# Patient Record
Sex: FEMALE | Race: BLACK OR AFRICAN AMERICAN | NOT HISPANIC OR LATINO | Employment: FULL TIME | URBAN - METROPOLITAN AREA
[De-identification: names, ages, dates, MRNs, and addresses within clinical notes are randomized per-mention and may not be internally consistent; named-entity substitution may affect disease eponyms.]

---

## 2017-07-07 ENCOUNTER — HOSPITAL ENCOUNTER (OUTPATIENT)
Facility: HOSPITAL | Age: 27
Discharge: HOME | End: 2017-07-07
Attending: PODIATRIST | Admitting: PODIATRIST
Payer: MEDICAID

## 2017-07-07 ENCOUNTER — ANESTHESIA EVENT (OUTPATIENT)
Dept: SURGERY | Facility: HOSPITAL | Age: 27
End: 2017-07-07
Payer: MEDICAID

## 2017-07-07 ENCOUNTER — ANESTHESIA (OUTPATIENT)
Dept: SURGERY | Facility: HOSPITAL | Age: 27
End: 2017-07-07
Payer: MEDICAID

## 2017-07-07 DIAGNOSIS — M20.40 HAMMER TOE: ICD-10-CM

## 2017-07-07 LAB
B-HCG UR QL: NEGATIVE
CTP QC/QA: YES

## 2017-07-07 PROCEDURE — 36000706: Performed by: PODIATRIST

## 2017-07-07 PROCEDURE — 63600175 PHARM REV CODE 636 W HCPCS: Performed by: NURSE ANESTHETIST, CERTIFIED REGISTERED

## 2017-07-07 PROCEDURE — 37000009 HC ANESTHESIA EA ADD 15 MINS: Performed by: PODIATRIST

## 2017-07-07 PROCEDURE — 25000003 PHARM REV CODE 250: Performed by: NURSE ANESTHETIST, CERTIFIED REGISTERED

## 2017-07-07 PROCEDURE — 71000015 HC POSTOP RECOV 1ST HR: Performed by: PODIATRIST

## 2017-07-07 PROCEDURE — 37000008 HC ANESTHESIA 1ST 15 MINUTES: Performed by: PODIATRIST

## 2017-07-07 PROCEDURE — 63600175 PHARM REV CODE 636 W HCPCS: Performed by: PODIATRIST

## 2017-07-07 PROCEDURE — 71000016 HC POSTOP RECOV ADDL HR: Performed by: PODIATRIST

## 2017-07-07 PROCEDURE — 25000003 PHARM REV CODE 250: Performed by: PODIATRIST

## 2017-07-07 PROCEDURE — 36000707: Performed by: PODIATRIST

## 2017-07-07 PROCEDURE — 27201423 OPTIME MED/SURG SUP & DEVICES STERILE SUPPLY: Performed by: PODIATRIST

## 2017-07-07 RX ORDER — MIDAZOLAM HYDROCHLORIDE 1 MG/ML
INJECTION, SOLUTION INTRAMUSCULAR; INTRAVENOUS
Status: DISCONTINUED | OUTPATIENT
Start: 2017-07-07 | End: 2017-07-07

## 2017-07-07 RX ORDER — ROPIVACAINE HYDROCHLORIDE 5 MG/ML
INJECTION, SOLUTION EPIDURAL; INFILTRATION; PERINEURAL
Status: DISCONTINUED | OUTPATIENT
Start: 2017-07-07 | End: 2017-07-07 | Stop reason: HOSPADM

## 2017-07-07 RX ORDER — ONDANSETRON 2 MG/ML
INJECTION INTRAMUSCULAR; INTRAVENOUS
Status: DISCONTINUED | OUTPATIENT
Start: 2017-07-07 | End: 2017-07-07

## 2017-07-07 RX ORDER — SODIUM CHLORIDE, SODIUM LACTATE, POTASSIUM CHLORIDE, CALCIUM CHLORIDE 600; 310; 30; 20 MG/100ML; MG/100ML; MG/100ML; MG/100ML
INJECTION, SOLUTION INTRAVENOUS CONTINUOUS PRN
Status: DISCONTINUED | OUTPATIENT
Start: 2017-07-07 | End: 2017-07-07

## 2017-07-07 RX ORDER — PROPOFOL 10 MG/ML
VIAL (ML) INTRAVENOUS
Status: DISCONTINUED | OUTPATIENT
Start: 2017-07-07 | End: 2017-07-07

## 2017-07-07 RX ORDER — CEFAZOLIN SODIUM 2 G/50ML
2 SOLUTION INTRAVENOUS
Status: DISCONTINUED | OUTPATIENT
Start: 2017-07-07 | End: 2017-07-07 | Stop reason: HOSPADM

## 2017-07-07 RX ORDER — PROPOFOL 10 MG/ML
VIAL (ML) INTRAVENOUS CONTINUOUS PRN
Status: DISCONTINUED | OUTPATIENT
Start: 2017-07-07 | End: 2017-07-07

## 2017-07-07 RX ORDER — FENTANYL CITRATE 50 UG/ML
INJECTION, SOLUTION INTRAMUSCULAR; INTRAVENOUS
Status: DISCONTINUED | OUTPATIENT
Start: 2017-07-07 | End: 2017-07-07

## 2017-07-07 RX ORDER — LIDOCAINE HYDROCHLORIDE 10 MG/ML
INJECTION, SOLUTION EPIDURAL; INFILTRATION; INTRACAUDAL; PERINEURAL
Status: DISCONTINUED | OUTPATIENT
Start: 2017-07-07 | End: 2017-07-07 | Stop reason: HOSPADM

## 2017-07-07 RX ORDER — LIDOCAINE HCL/PF 100 MG/5ML
SYRINGE (ML) INTRAVENOUS
Status: DISCONTINUED | OUTPATIENT
Start: 2017-07-07 | End: 2017-07-07

## 2017-07-07 RX ORDER — HYDROCODONE BITARTRATE AND ACETAMINOPHEN 5; 325 MG/1; MG/1
1 TABLET ORAL EVERY 4 HOURS PRN
Status: DISCONTINUED | OUTPATIENT
Start: 2017-07-07 | End: 2017-07-07 | Stop reason: HOSPADM

## 2017-07-07 RX ADMIN — FENTANYL CITRATE 25 MCG: 50 INJECTION, SOLUTION INTRAMUSCULAR; INTRAVENOUS at 10:07

## 2017-07-07 RX ADMIN — LIDOCAINE HYDROCHLORIDE 80 MG: 20 INJECTION, SOLUTION INTRAVENOUS at 10:07

## 2017-07-07 RX ADMIN — CEFAZOLIN SODIUM 2 G: 2 SOLUTION INTRAVENOUS at 10:07

## 2017-07-07 RX ADMIN — ONDANSETRON 4 MG: 2 INJECTION, SOLUTION INTRAMUSCULAR; INTRAVENOUS at 10:07

## 2017-07-07 RX ADMIN — PROPOFOL 50 MG: 10 INJECTION, EMULSION INTRAVENOUS at 10:07

## 2017-07-07 RX ADMIN — MIDAZOLAM 3 MG: 1 INJECTION INTRAMUSCULAR; INTRAVENOUS at 09:07

## 2017-07-07 RX ADMIN — PROPOFOL 125 MCG/KG/MIN: 10 INJECTION, EMULSION INTRAVENOUS at 10:07

## 2017-07-07 RX ADMIN — PROPOFOL 30 MG: 10 INJECTION, EMULSION INTRAVENOUS at 10:07

## 2017-07-07 RX ADMIN — SODIUM CHLORIDE, SODIUM LACTATE, POTASSIUM CHLORIDE, AND CALCIUM CHLORIDE: .6; .31; .03; .02 INJECTION, SOLUTION INTRAVENOUS at 08:07

## 2017-07-07 NOTE — BRIEF OP NOTE
[facility]  Brief Operative Note     SUMMARY     Surgery Date: 7/7/2017     Surgeon(s) and Role:     * Juan Leigh DPM - Primary    Assisting Surgeon: Kristine Villanueva DPM, PGY2    Pre-op Diagnosis:  Acquired bilateral hammer toes [M20.41, M20.42]    Post-op Diagnosis:  Post-Op Diagnosis Codes:     * Acquired bilateral hammer toes [M20.41, M20.42]    Procedure(s) (LRB):  REPAIR-HAMMER TOE (Bilateral)    Anesthesia: Local MAC    Description of the findings of the procedure: Hammer toe with varus rotation and exostosis of donavon 5th digits    Findings/Key Components: arthroplasty of 5th digit with exostectomy donavon.     Estimated Blood Loss: < 5 cc        Specimens:   Specimen (12h ago through future)    None          Discharge Note    SUMMARY     Admit Date: 7/7/2017    Discharge Date and Time:  07/07/2017 11:15 AM    Hospital Course (synopsis of major diagnoses, care, treatment, and services provided during the course of the hospital stay):    Final Diagnosis: Post-Op Diagnosis Codes:     * Acquired bilateral hammer toes [M20.41, M20.42]    Disposition: Home or Self Care    Follow Up/Patient Instructions:     Medications:  Reconciled Home Medications:   Current Discharge Medication List      CONTINUE these medications which have NOT CHANGED    Details   bifidobacterium infantis (ALIGN) 4 mg Cap Take 1 capsule by mouth once daily.  Qty: 30 capsule, Refills: 0      boric acid (BORIC ACID) vaginal suppository insert ONE SUPPOSITORY VAGINALLY EVERY NIGHT AT BEDTIME  Refills: 1             Discharge Procedure Orders  Diet general     Ice to affected area     Keep surgical extremity elevated     Weight bearing restrictions (specify)     Call MD for:  temperature >100.4     Call MD for:  persistent nausea and vomiting     Call MD for:  severe uncontrolled pain     Call MD for:  difficulty breathing, headache or visual disturbances     Call MD for:  redness, tenderness, or signs of infection (pain,  swelling, redness, odor or green/yellow discharge around incision site)     Call MD for:  hives     Call MD for:  persistent dizziness or light-headedness     Call MD for:  extreme fatigue     Leave dressing on - Keep it clean, dry, and intact until clinic visit       Follow-up Information     Juan Leigh DPM.    Specialty:  Podiatry  Contact information:  [id number]  [place] [id number] [phone]

## 2017-07-07 NOTE — PLAN OF CARE
Problem: Patient Care Overview  Goal: Plan of Care Review  vss nad discharge instructions given with claimed understanding. Denies pain

## 2017-07-07 NOTE — OP NOTE
Operative Note       Surgery Date: 7/7/2017     Surgeon(s) and Role:     * Juan Leigh DPM - Primary    Pre-op Diagnosis:  Acquired bilateral hammer toes [M20.41, M20.42]    Post-op Diagnosis: Post-Op Diagnosis Codes:     * Acquired bilateral hammer toes [M20.41, M20.42]    Procedure(s) (LRB):  REPAIR-HAMMER TOE (Bilateral)    Anesthesia: Local MAC    Procedure in Detail/Findings:  The patient was brought to the operating room on a stretcher and placed on the operating table in a supine position. Following the successful induction of MAC anesthesia, a tourniquet was applied to the patients donavon ankle. Following this, a local anesthetic block was given as described above. Then, the donavon foot was scrubbed, prepped and draped in the usual aseptic manner. A marking pen was utilized to create incision guides along the 5th MTPJ/PIPJ, donavon. A time out was performed and an esmark was used to exsanguinate the foot. The tourniquet was inflated to 250mmHg.   Attention was then directed towards the 5th digit of the left foot, where an elliptical piece of skin was removed from the dorsal lateral aspect of the digit to allow for abduction with closure to reduce the adductovarus noted within the toe. The incision was deepened through the subcutaneous tissue and into the PIPJ using a transverse capsulotomy. The head of the proximal phalanx was resected using the sagittal saw for a digital arthroplasty. Attention was then drawn to the lateral distal exostosis, where a sterile # 15 blade was used to incise the skin down to bone and the sagittal saw was used to delicately feather the bone until it was smooth. This same exact procedure, including the correction of adductovarus deformity, arthroplasty, and exostectomy, was then performed on the right foot.   All areas were copioulsy irrigated with saline and all subcutaneous and deep structures were re-approximated with 4-0 vicryl. The skin incision was re-approximated with 4-0 Nylon  in simple sutures. The toe of each foot was gently splinted in a rectus position with longitudinal 4x4 gauze. The incision was covered with xeroform, 4x4 fluff gauze, kerlix and ACE wrap. Both feet were secured in a post op shoe.   The patient tolerated the procedure and anesthesia well. She was transferred to the recovery room with vital signs stable, vascular status intact, and capillary refill time < 3 seconds to the distal donavon foot. Following a period of post op monitoring, the patient will be discharged home on the following written and oral post op instructions:   1. Keep dressing dry and intact until clinic visit.  2. Avoid excessive ambulation, ambulate with surgical shoe on at all times.  3. Ice and elevate right foot when at rest.  4. All prescriptions were given to patient post operatively  5. Contact Dr. Leigh for all post op follow up care and if any problems arise.      Estimated Blood Loss: < 5 cc          Specimens     None        Implants: * No implants in log *           Disposition: PACU - hemodynamically stable.           Condition: Good    Attestation:  I was present and scrubbed for the entire procedure.           Discharge Note    Admit Date: 7/7/2017    Attending Physician: Juan Leigh DPM     Discharge Physician: Juan Leigh DPM    Final Diagnosis: Post-Op Diagnosis Codes:     * Acquired bilateral hammer toes [M20.41, M20.42]    Disposition: Home or Self Care    Patient Instructions:   Current Discharge Medication List      CONTINUE these medications which have NOT CHANGED    Details   bifidobacterium infantis (ALIGN) 4 mg Cap Take 1 capsule by mouth once daily.  Qty: 30 capsule, Refills: 0      boric acid (BORIC ACID) vaginal suppository insert ONE SUPPOSITORY VAGINALLY EVERY NIGHT AT BEDTIME  Refills: 1             Discharge Procedure Orders (must include Diet, Follow-up, Activity)    Discharge Procedure Orders (must include Diet, Follow-up, Activity)  Diet general     Ice  to affected area     Keep surgical extremity elevated     Weight bearing restrictions (specify)     Call MD for:  temperature >100.4     Call MD for:  persistent nausea and vomiting     Call MD for:  severe uncontrolled pain     Call MD for:  difficulty breathing, headache or visual disturbances     Call MD for:  redness, tenderness, or signs of infection (pain, swelling, redness, odor or green/yellow discharge around incision site)     Call MD for:  hives     Call MD for:  persistent dizziness or light-headedness     Call MD for:  extreme fatigue     Leave dressing on - Keep it clean, dry, and intact until clinic visit          Discharge Date: No discharge date for patient encounter.

## 2017-07-07 NOTE — PLAN OF CARE
POC board updated and reviewed with pt and pt's family. Pt and pt's family verbalize understanding. Safety precautions maintained. Call bell in reach. Bed locked and in lowest position. Instructed pt to call for assistance. Pt verbalizes understanding.

## 2017-07-07 NOTE — ANESTHESIA POSTPROCEDURE EVALUATION
"Anesthesia Post Evaluation    Patient: Iris Jarvis    Procedure(s) Performed: Procedure(s) (LRB):  REPAIR-HAMMER TOE (Bilateral)    Final Anesthesia Type: MAC  Patient location during evaluation: OPS  Patient participation: Yes- Able to Participate  Level of consciousness: awake and alert and oriented  Post-procedure vital signs: reviewed and stable  Pain management: adequate  Airway patency: patent  PONV status at discharge: No PONV  Anesthetic complications: no      Cardiovascular status: blood pressure returned to baseline, hemodynamically stable and stable  Respiratory status: unassisted, spontaneous ventilation and room air  Hydration status: euvolemic  Follow-up not needed.        Visit Vitals  /62 (BP Location: Right arm, Patient Position: Lying, BP Method: Automatic)   Pulse 78   Temp 37 °C (98.6 °F) (Oral)   Resp 16   Ht 5' 3" (1.6 m)   Wt 70.8 kg (156 lb)   LMP 06/19/2017 (Approximate)   Breastfeeding? No   BMI 27.63 kg/m²       Pain/Erlin Score: Pain Assessment Performed: Yes (7/7/2017  8:20 AM)  Presence of Pain: denies (7/7/2017  8:20 AM)      "

## 2017-07-07 NOTE — ANESTHESIA PREPROCEDURE EVALUATION
07/07/2017  Iris Jarvis is a 26 y.o., female w [facility] for podiatric repair.    PRIOR ANES (in Epic)    none     ANES-RELATED MED/SURG  There is no problem list on file for this patient.    Past Medical History:   Diagnosis Date    Keloid      Past Surgical History:   Procedure Laterality Date    none       ALLERGIES  Review of patient's allergies indicates:  No Known Allergies    ANES-RELATED HOME Rx   none    Anesthesia Evaluation         Review of Systems  Anesthesia Hx:  Neg history of prior surgery. Denies Family Hx of Anesthesia complications.   Social:  Non-Smoker    EENT/Dental:EENT/Dental Normal   Cardiovascular:  Cardiovascular Normal     Pulmonary:  Pulmonary Normal    Musculoskeletal:  Musculoskeletal Normal    OB/GYN/PEDS:   2017-07-07 denies pregnancy; UPT neg today   Endocrine:  Endocrine Normal      Wt Readings from Last 1 Encounters:   07/14/16 64 kg (141 lb 1.5 oz)     Temp Readings from Last 1 Encounters:   03/28/16 36.6 °C (97.9 °F) (Oral)     BP Readings from Last 1 Encounters:   05/05/16 100/70     Pulse Readings from Last 1 Encounters:   04/15/16 83     SpO2 Readings from Last 1 Encounters:   04/15/16 98%       Physical Exam  General:  Well nourished    Airway/Jaw/Neck:  AIRWAY FINDINGS: Normal         Heart/Vascular:  Heart Findings: Normal Heart murmur: negative       Mental Status:  Mental Status Findings: Normal       Lab Results   Component Value Date    WBC 4.90 04/15/2016    HGB 12.8 04/15/2016    HCT 39.5 04/15/2016    MCV 89 04/15/2016     04/15/2016       Chemistry        Component Value Date/Time     04/15/2016 1058    K 4.1 04/15/2016 1058     04/15/2016 1058    CO2 20 (L) 04/15/2016 1058    BUN 10 04/15/2016 1058    CREATININE 0.7 04/15/2016 1058    GLU 85 04/15/2016 1058        Component Value Date/Time    CALCIUM 9.3 04/15/2016 1058     ALKPHOS 46 (L) 04/15/2016 1058    AST 13 04/15/2016 1058    ALT 11 04/15/2016 1058    BILITOT 0.6 04/15/2016 1058    ESTGFRAFRICA >60.0 04/15/2016 1058    EGFRNONAA >60.0 04/15/2016 1058          Lab Results   Component Value Date    ALBUMIN 3.7 04/15/2016     No results found for: TSH, N3YTGQQ, V3BJVAZ, THYROIDAB    CXR      EKG      ECHO      Anesthesia Plan  Type of Anesthesia, risks & benefits discussed:  Anesthesia Type:  MAC, regional, general  Patient's Preference:   Intra-op Monitoring Plan:   Intra-op Monitoring Plan Comments:   Post Op Pain Control Plan:   Post Op Pain Control Plan Comments:   Induction:    Beta Blocker:  Patient is not currently on a Beta-Blocker (No further documentation required).       Informed Consent: Patient understands risks and agrees with Anesthesia plan.  Questions answered. Anesthesia consent signed with patient.  ASA Score: 1     Day of Surgery Review of History & Physical:            Ready For Surgery From Anesthesia Perspective.

## 2017-07-07 NOTE — DISCHARGE INSTRUCTIONS
DIET: You may resume your home diet. If nausea is present, increase your diet gradually with fluids and bland foods    ACTIVITY LEVEL: If you have received sedation or an anesthetic, you may feel sleepy for several hours. Rest until you are more awake. Gradually resume your normal activities    Medications: Pain medication should be taken only if needed and as directed. If antibiotics are prescribed, the medication should be taken until completed. You will be given an updated list of you medications.    No driving, alcoholic beverages or signing legal documents for next 24 hours or while taking pain medication.       CALL THE DOCTOR:    For any obvious bleeding (some dried blood over the incision is normal).      Redness, swelling, foul smell around incision or fever over 101.   Shortness of breath, Coughing up Bloody sputum, Pains or Swelling in your Calves .   Persistent pain or nausea not relieved by medication.    If any unusual problems or difficulties occur contact your doctor. If you cannot contact your doctor but feel your signs and symptoms warrant a physicians attention return to the emergency room.

## 2017-07-10 VITALS
SYSTOLIC BLOOD PRESSURE: 115 MMHG | BODY MASS INDEX: 27.64 KG/M2 | OXYGEN SATURATION: 97 % | WEIGHT: 156 LBS | TEMPERATURE: 98 F | RESPIRATION RATE: 15 BRPM | HEIGHT: 63 IN | DIASTOLIC BLOOD PRESSURE: 66 MMHG | HEART RATE: 81 BPM

## 2017-11-08 ENCOUNTER — HOSPITAL ENCOUNTER (EMERGENCY)
Facility: HOSPITAL | Age: 27
Discharge: HOME | End: 2017-11-08
Attending: EMERGENCY MEDICINE
Payer: MEDICAID

## 2017-11-08 VITALS
WEIGHT: 162 LBS | HEIGHT: 63 IN | SYSTOLIC BLOOD PRESSURE: 128 MMHG | OXYGEN SATURATION: 100 % | DIASTOLIC BLOOD PRESSURE: 80 MMHG | HEART RATE: 70 BPM | RESPIRATION RATE: 20 BRPM | BODY MASS INDEX: 28.7 KG/M2 | TEMPERATURE: 98 F

## 2017-11-08 DIAGNOSIS — M25.512 ACUTE PAIN OF LEFT SHOULDER: Primary | ICD-10-CM

## 2017-11-08 PROCEDURE — 99283 EMERGENCY DEPT VISIT LOW MDM: CPT

## 2017-11-08 PROCEDURE — 25000003 PHARM REV CODE 250: Performed by: EMERGENCY MEDICINE

## 2017-11-08 RX ORDER — ETHYNODIOL DIACETATE AND ETHINYL ESTRADIOL 1 MG-35MCG
1 KIT ORAL DAILY
COMMUNITY
End: 2018-07-17

## 2017-11-08 RX ORDER — IBUPROFEN 400 MG/1
800 TABLET ORAL
Status: COMPLETED | OUTPATIENT
Start: 2017-11-08 | End: 2017-11-08

## 2017-11-08 RX ORDER — IBUPROFEN 800 MG/1
800 TABLET ORAL EVERY 6 HOURS PRN
Qty: 20 TABLET | Refills: 0 | Status: SHIPPED | OUTPATIENT
Start: 2017-11-08 | End: 2018-07-17

## 2017-11-08 RX ADMIN — IBUPROFEN 800 MG: 400 TABLET, FILM COATED ORAL at 08:11

## 2017-11-08 NOTE — ED PROVIDER NOTES
Encounter Date: 11/8/2017       History     Chief Complaint   Patient presents with    Arm Pain     bilateral arm pain x 2 month intermittently, saw PCP was advised to take ibuprofen and eat bananas but no improvement     Patient is a 27-year-old female who presents to emergency department for evaluation of left shoulder pain that hurts more with movement of her left shoulder.  She has had pain intermittently in her shoulders in the past 2 months.  Patient only the pain radiates from the left shoulder to the left forearm.  She denies any focal weakness or numbness headache fevers neck pain chest pain shortness of breath back pain rash other joint pain or history of lupus.  She has no other complaints at this time.  Her primary care doctor told her to take anti-inflammatories and eat some bananas.  She's never seen an orthopedist.  She denies any trauma or repetitive movement of her left upper extremity's.           Review of patient's allergies indicates:  No Known Allergies  Past Medical History:   Diagnosis Date    Keloid      Past Surgical History:   Procedure Laterality Date    none       Family History   Problem Relation Age of Onset    Hypertension Mother     Hypertension Maternal Grandmother     Diabetes Father     Diabetes Paternal Grandmother     Diabetes Paternal Aunt     Ovarian cancer Neg Hx     Colon cancer Neg Hx     Breast cancer Neg Hx      Social History   Substance Use Topics    Smoking status: Never Smoker    Smokeless tobacco: Never Used    Alcohol use 0.6 oz/week     1 Standard drinks or equivalent per week      Comment: ocasionally      Review of Systems   Respiratory: Negative for shortness of breath.    Cardiovascular: Negative for chest pain.   Musculoskeletal: Positive for arthralgias (bilateral shoulders left greater than right). Negative for back pain and neck pain.   Skin: Negative for rash and wound.   Neurological: Negative for weakness, numbness and headaches.   All  other systems reviewed and are negative.      Physical Exam     Initial Vitals [11/08/17 0729]   BP Pulse Resp Temp SpO2   130/72 82 18 98 °F (36.7 °C) 99 %      MAP       91.33         Physical Exam    Constitutional: She appears well-developed and well-nourished. No distress.   Cardiovascular: Normal rate, regular rhythm, normal heart sounds and intact distal pulses.   2+ radial pulse in the left upper extremity   Pulmonary/Chest: Breath sounds normal. She has no wheezes. She has no rhonchi. She has no rales.   Abdominal: Soft. There is no tenderness.   Musculoskeletal:   Tenderness over the left lateral anterior shoulder.  Not tender over the supraspinatus bursa.  Only able to abduct her shoulder to 90°.  Cannot raise her hand over her head.   Neurological: She is alert and oriented to person, place, and time. She has normal strength. No sensory deficit.   Skin: Skin is warm and dry.         ED Course   Procedures  Labs Reviewed - No data to display          Medical Decision Making:   Patient appears to have inflammation of the left shoulder.  Differential includes autoimmune arthritis, muscle sprain, radiculopathy although she doesn't really have neck pain.  She has no fevers.  I do not believe this is a septic joint given no dysuria or fevers.  There is no erythema or warmth of the shoulder.  She could've a bursitis or tendinitis.  She really needs to follow-up with orthopedics for further workup.  I feel that she is stable for discharge at this time.  I will start her on anti-inflammatories and a shoulder sling.  I don't think she needs emergent workup.                   ED Course      Clinical Impression:   The encounter diagnosis was Acute pain of left shoulder.                           Steven Simms MD  11/08/17 1143

## 2017-11-08 NOTE — ED NOTES
27 year old female presents to ed cc of  Intermittent bilateral arm pain x 2 months. Patient states she feels like weights are on her arms. Patient denies fall or injury to arms. States she was seen at pcp and told to eat bananas  And take ibuprofen for symptoms

## 2017-11-08 NOTE — ED NOTES
APPEARANCE: Alert, oriented and in no acute distress.  CARDIAC: Normal rate and rhythm, no murmur heard.     RESPIRATORY:Normal rate and effort, breath sounds clear bilaterally throughout chest. Respirations are equal and unlabored no obvious signs of distress.  GASTRO: soft, bowel sounds normal, no tenderness, no abdominal distention.  SKIN: Skin is warm and dry, normal skin turgor, mucous membranes moist.  NEURO: 5/5 strength major flexors/extensors bilaterally. Sensory intact to light touch bilaterally. Darwin coma scale: eyes open spontaneously-4, oriented & converses-5, obeys commands-6. No neurological abnormalities.   MENTAL STATUS: awake, alert and aware of environment.  EYE: PERRL, both eyes: pupils brisk and reactive to light. Normal size.  ENT: EARS: no obvious drainage. NOSE: no active bleeding.

## 2018-06-19 ENCOUNTER — HOSPITAL ENCOUNTER (OUTPATIENT)
Dept: RADIOLOGY | Facility: HOSPITAL | Age: 28
Discharge: HOME | End: 2018-06-19
Attending: INTERNAL MEDICINE
Payer: MEDICAID

## 2018-06-19 DIAGNOSIS — M05.741 RHEUMATOID ARTHRITIS INVOLVING BOTH HANDS WITH POSITIVE RHEUMATOID FACTOR: Primary | ICD-10-CM

## 2018-06-19 DIAGNOSIS — M05.741 RHEUMATOID ARTHRITIS INVOLVING BOTH HANDS WITH POSITIVE RHEUMATOID FACTOR: ICD-10-CM

## 2018-06-19 DIAGNOSIS — M05.742 RHEUMATOID ARTHRITIS INVOLVING BOTH HANDS WITH POSITIVE RHEUMATOID FACTOR: ICD-10-CM

## 2018-06-19 DIAGNOSIS — M05.742 RHEUMATOID ARTHRITIS INVOLVING BOTH HANDS WITH POSITIVE RHEUMATOID FACTOR: Primary | ICD-10-CM

## 2018-06-19 PROCEDURE — 73620 X-RAY EXAM OF FOOT: CPT | Mod: 26,50,, | Performed by: RADIOLOGY

## 2018-06-19 PROCEDURE — 73620 X-RAY EXAM OF FOOT: CPT | Mod: 50,TC,FY

## 2018-06-19 PROCEDURE — 73120 X-RAY EXAM OF HAND: CPT | Mod: 26,50,, | Performed by: RADIOLOGY

## 2018-06-19 PROCEDURE — 73120 X-RAY EXAM OF HAND: CPT | Mod: 50,TC,FY

## 2018-07-17 ENCOUNTER — HOSPITAL ENCOUNTER (EMERGENCY)
Facility: HOSPITAL | Age: 28
Discharge: HOME | End: 2018-07-17
Attending: EMERGENCY MEDICINE | Admitting: EMERGENCY MEDICINE
Payer: MEDICAID

## 2018-07-17 VITALS
WEIGHT: 150 LBS | OXYGEN SATURATION: 98 % | TEMPERATURE: 99 F | SYSTOLIC BLOOD PRESSURE: 120 MMHG | RESPIRATION RATE: 16 BRPM | HEART RATE: 88 BPM | BODY MASS INDEX: 26.58 KG/M2 | DIASTOLIC BLOOD PRESSURE: 71 MMHG | HEIGHT: 63 IN

## 2018-07-17 DIAGNOSIS — R05.9 COUGH: ICD-10-CM

## 2018-07-17 DIAGNOSIS — I88.9 LYMPHADENITIS: Primary | ICD-10-CM

## 2018-07-17 LAB
ALBUMIN SERPL BCP-MCNC: 3.5 G/DL
ALP SERPL-CCNC: 37 U/L
ALT SERPL W/O P-5'-P-CCNC: 15 U/L
AMYLASE SERPL-CCNC: 857 U/L
ANION GAP SERPL CALC-SCNC: 8 MMOL/L
AST SERPL-CCNC: 21 U/L
B-HCG UR QL: NEGATIVE
BASOPHILS # BLD AUTO: 0.01 K/UL
BASOPHILS NFR BLD: 0.3 %
BILIRUB SERPL-MCNC: 0.2 MG/DL
BUN SERPL-MCNC: 10 MG/DL
CALCIUM SERPL-MCNC: 9.2 MG/DL
CHLORIDE SERPL-SCNC: 104 MMOL/L
CO2 SERPL-SCNC: 25 MMOL/L
CREAT SERPL-MCNC: 0.7 MG/DL
CTP QC/QA: YES
DEPRECATED S PYO AG THROAT QL EIA: NEGATIVE
DIFFERENTIAL METHOD: ABNORMAL
EOSINOPHIL # BLD AUTO: 0 K/UL
EOSINOPHIL NFR BLD: 0 %
ERYTHROCYTE [DISTWIDTH] IN BLOOD BY AUTOMATED COUNT: 13 %
EST. GFR  (AFRICAN AMERICAN): >60 ML/MIN/1.73 M^2
EST. GFR  (NON AFRICAN AMERICAN): >60 ML/MIN/1.73 M^2
GLUCOSE SERPL-MCNC: 81 MG/DL
HCT VFR BLD AUTO: 40.6 %
HETEROPH AB SERPL QL IA: NEGATIVE
HGB BLD-MCNC: 12.5 G/DL
LYMPHOCYTES # BLD AUTO: 1.1 K/UL
LYMPHOCYTES NFR BLD: 32.7 %
MCH RBC QN AUTO: 26.6 PG
MCHC RBC AUTO-ENTMCNC: 30.8 G/DL
MCV RBC AUTO: 86 FL
MONOCYTES # BLD AUTO: 0.7 K/UL
MONOCYTES NFR BLD: 20.4 %
NEUTROPHILS # BLD AUTO: 1.6 K/UL
NEUTROPHILS NFR BLD: 46.6 %
PLATELET # BLD AUTO: 191 K/UL
PMV BLD AUTO: 10.7 FL
POTASSIUM SERPL-SCNC: 4.3 MMOL/L
PROT SERPL-MCNC: 7.7 G/DL
RBC # BLD AUTO: 4.7 M/UL
SODIUM SERPL-SCNC: 137 MMOL/L
WBC # BLD AUTO: 3.43 K/UL

## 2018-07-17 PROCEDURE — 87081 CULTURE SCREEN ONLY: CPT

## 2018-07-17 PROCEDURE — 85025 COMPLETE CBC W/AUTO DIFF WBC: CPT

## 2018-07-17 PROCEDURE — 99284 EMERGENCY DEPT VISIT MOD MDM: CPT | Mod: 25

## 2018-07-17 PROCEDURE — 87880 STREP A ASSAY W/OPTIC: CPT

## 2018-07-17 PROCEDURE — 81025 URINE PREGNANCY TEST: CPT | Performed by: EMERGENCY MEDICINE

## 2018-07-17 PROCEDURE — 25500020 PHARM REV CODE 255: Performed by: EMERGENCY MEDICINE

## 2018-07-17 PROCEDURE — 80053 COMPREHEN METABOLIC PANEL: CPT

## 2018-07-17 PROCEDURE — 82150 ASSAY OF AMYLASE: CPT

## 2018-07-17 PROCEDURE — 86308 HETEROPHILE ANTIBODY SCREEN: CPT

## 2018-07-17 RX ORDER — FOLIC ACID 1 MG/1
1 TABLET ORAL DAILY
COMMUNITY

## 2018-07-17 RX ORDER — CLINDAMYCIN HYDROCHLORIDE 300 MG/1
300 CAPSULE ORAL 4 TIMES DAILY
Qty: 28 CAPSULE | Refills: 0 | Status: SHIPPED | OUTPATIENT
Start: 2018-07-17 | End: 2018-07-24

## 2018-07-17 RX ORDER — PREDNISONE 5 MG/1
5 TABLET ORAL DAILY
COMMUNITY
End: 2018-11-16

## 2018-07-17 RX ORDER — METHOTREXATE 2.5 MG/1
TABLET ORAL
COMMUNITY
End: 2021-08-19 | Stop reason: ALTCHOICE

## 2018-07-17 RX ORDER — ETHYNODIOL DIACETATE AND ETHINYL ESTRADIOL 1 MG-35MCG
1 KIT ORAL DAILY
COMMUNITY

## 2018-07-17 RX ADMIN — IOHEXOL 75 ML: 350 INJECTION, SOLUTION INTRAVENOUS at 01:07

## 2018-07-17 NOTE — ED PROVIDER NOTES
Encounter Date: 7/17/2018    SCRIBE #1 NOTE: I, Ondina Gonzales, am scribing for, and in the presence of,  Dr. Ramos. I have scribed the entire note.       History     Chief Complaint   Patient presents with    Lymphadenopathy     glands under neck swollen since the weekend, denies fever. sees Gilda Miguel MD for RA but that doctor sent her to ER     Time seen by the provider: 11:28 AM    This is a 27 y.o. female who has a past medical history of Keloid and RA who presents to the Emergency Department from Dr. Miguel's office with progressively worsening swollen lymph nodes x 4 days. Symptoms are associated with trouble swallowing. Pt denies fever. She reports no alleviating or exacerbating factors. Patient has no prior history of similar symptoms. She had all her vaccinations as a child. Denies fevers or dyspnea. Pt is not diabetic        The history is provided by the patient.     Review of patient's allergies indicates:  No Known Allergies  Past Medical History:   Diagnosis Date    Keloid     RA (rheumatoid arthritis)      Past Surgical History:   Procedure Laterality Date    none       Family History   Problem Relation Age of Onset    Hypertension Mother     Hypertension Maternal Grandmother     Diabetes Father     Diabetes Paternal Grandmother     Diabetes Paternal Aunt     Ovarian cancer Neg Hx     Colon cancer Neg Hx     Breast cancer Neg Hx      Social History   Substance Use Topics    Smoking status: Never Smoker    Smokeless tobacco: Never Used    Alcohol use 0.6 oz/week     1 Standard drinks or equivalent per week      Comment: ocasionally      Review of Systems   Constitutional: Negative for activity change, appetite change, chills, diaphoresis, fatigue and fever.   HENT: Positive for trouble swallowing. Negative for sore throat.         Positive for swollen lymph nodes.   Respiratory: Negative for cough, chest tightness and shortness of breath.    Cardiovascular: Negative for chest pain  and palpitations.   Gastrointestinal: Negative for abdominal distention, abdominal pain, diarrhea, nausea and vomiting.   Endocrine: Negative for polydipsia and polyphagia.   Genitourinary: Negative for difficulty urinating, dysuria and flank pain.   Musculoskeletal: Negative for arthralgias.   Skin: Negative for pallor and rash.   Neurological: Negative for dizziness and headaches.   Psychiatric/Behavioral: Negative for confusion.   All other systems reviewed and are negative.      Physical Exam     Initial Vitals [07/17/18 1105]   BP Pulse Resp Temp SpO2   116/68 98 18 98.6 °F (37 °C) 98 %      MAP       --         Physical Exam    Nursing note and vitals reviewed.  Constitutional: She appears well-developed and well-nourished. She is not diaphoretic. No distress.   HENT:   Head: Atraumatic.   Diffuse swelling around both sides of face extending below angle of mandible.  Mild posterior orophangeal erythema and inflammation.   Eyes: Conjunctivae and EOM are normal. Pupils are equal, round, and reactive to light. No scleral icterus.   Neck: Normal range of motion. Neck supple.   Cardiovascular: Normal rate, regular rhythm and normal heart sounds. Exam reveals no gallop and no friction rub.    No murmur heard.  Pulmonary/Chest: Breath sounds normal. She has no wheezes. She has no rhonchi. She has no rales.   Abdominal: Soft. Bowel sounds are normal. There is no tenderness.   Musculoskeletal: Normal range of motion. She exhibits no edema or tenderness.   Lymphadenopathy:   Small left cervical adenopathy.   Neurological: She is alert and oriented to person, place, and time.   Skin: Skin is warm and dry. No rash noted. No erythema.   Psychiatric: She has a normal mood and affect. Her behavior is normal.         ED Course   Procedures  Labs Reviewed   CBC W/ AUTO DIFFERENTIAL - Abnormal; Notable for the following:        Result Value    WBC 3.43 (*)     MCH 26.6 (*)     MCHC 30.8 (*)     Gran # (ANC) 1.6 (*)     Mono%  20.4 (*)     All other components within normal limits   COMPREHENSIVE METABOLIC PANEL - Abnormal; Notable for the following:     Alkaline Phosphatase 37 (*)     All other components within normal limits   AMYLASE - Abnormal; Notable for the following:     Amylase 857 (*)     All other components within normal limits   THROAT SCREEN, RAPID   CULTURE, STREP A,  THROAT   HETEROPHILE AB SCREEN   POCT URINE PREGNANCY          Imaging Results          CT Soft Tissue Neck With Contrast (Final result)  Result time 07/17/18 13:45:36    Final result by Marina Jeffery MD (07/17/18 13:45:36)                 Impression:      Several normal and upper normal size nodes within the bilateral neck region.  They could be reactive to an underlying inflammatory or infectious process.  A neoplastic process is consider less likely.  Follow-up advised to ensure resolution.    Atrophic right submandibular gland.      Electronically signed by: Marina Jeffery MD  Date:    07/17/2018  Time:    13:45             Narrative:    EXAMINATION:  CT SOFT TISSUE NECK WITH CONTRAST    CLINICAL HISTORY:  neck swelling;    TECHNIQUE:  Low dose axial images as well as sagittal and coronal reconstructions were performed from the skull base to the clavicles following the intravenous administration of 75 mL of Omnipaque 350.    COMPARISON:  None    FINDINGS:  The visualized portion of the brain appears normal.  The ocular globes and orbits appear within normal limits.  The paranasal sinuses demonstrate very minimal mucosal thickening of a right anterior ethmoid air cells.  The mastoid air cells are well aerated.  The nasopharynx, oropharynx and hypopharynx appear normal.  The epiglottis appear normal.  The thyroid gland appears normal.  The lung apices demonstrate no abnormalities.  The right submandibular gland is atrophic.  The left submandibular gland appears normal.  The bilateral parotid glands appear symmetrical.  Normal size small nodes  noted within the upper and lower neck region.  No adenopathy seen.  The largest node within the left neck region open (smaller transverse diameter measures 0.8 cm.  Node within the right lower neck region measures is 0.8 cm 0.8 cm.  The  space, parapharyngeal space and prevertebral space appear normal.  Normal size nodes seen within the posterior triangles bilaterally.  The visualized osseous structures demonstrate no advanced degenerative change.  No osseous lesions.                               X-Ray Chest PA And Lateral (Final result)  Result time 07/17/18 12:31:01    Final result by Segundo Guzman MD (07/17/18 12:31:01)                 Impression:      Normal radiographic appearance of the chest.      Electronically signed by: Segundo Guzman MD  Date:    07/17/2018  Time:    12:31             Narrative:    EXAMINATION:  XR CHEST PA AND LATERAL    CLINICAL HISTORY:  Cough    TECHNIQUE:  PA and lateral views of the chest were performed.    COMPARISON:  Abdominal series 01/18/2012    FINDINGS:  The lungs are clear, with normal appearance of pulmonary vasculature and no pleural effusion or pneumothorax.    The cardiac silhouette is normal in size. The hilar and mediastinal contours are unremarkable.    Bones are intact.    Bilateral nipple metallic piercing jewelry noted.                                 Medical Decision Making:   Clinical Tests:   Lab Tests: Ordered and Reviewed  Radiological Study: Ordered and Reviewed  ED Management:  Ct shows diffuse adenopathy. No parotitis or abscess.  Likely viral infection. Pt will f/u w dr leigh. Instructed to return to the ed if any worsening of her symptoms or difficulty breathing              Attending Attestation:           Physician Attestation for Scribe:  Physician Attestation Statement for Scribe #1: I, Case Ramos, reviewed documentation, as scribed by Ondina Gonzales in my presence, and it is both accurate and complete.                    Clinical  Impression:     1. Lymphadenitis    2. Cough            Disposition:   Disposition: Discharged  Condition: Stable                        Case Ramos MD  07/17/18 [id number]

## 2018-07-17 NOTE — ED NOTES
Pt presented with C/O swelling to left side of face and neck that started behind both ears Friday night and progressively became worse. Has had a HA also. Denies N/V/D no fever or sob. No complications with swallowing.

## 2018-07-19 LAB — BACTERIA THROAT CULT: NORMAL

## 2018-11-16 ENCOUNTER — HOSPITAL ENCOUNTER (EMERGENCY)
Facility: HOSPITAL | Age: 28
Discharge: HOME | End: 2018-11-16
Attending: EMERGENCY MEDICINE
Payer: MEDICAID

## 2018-11-16 VITALS
RESPIRATION RATE: 18 BRPM | DIASTOLIC BLOOD PRESSURE: 71 MMHG | HEIGHT: 63 IN | TEMPERATURE: 98 F | OXYGEN SATURATION: 100 % | HEART RATE: 84 BPM | BODY MASS INDEX: 26.57 KG/M2 | SYSTOLIC BLOOD PRESSURE: 134 MMHG

## 2018-11-16 DIAGNOSIS — R10.9 LEFT SIDED ABDOMINAL PAIN: ICD-10-CM

## 2018-11-16 DIAGNOSIS — N83.201 CYST OF RIGHT OVARY: Primary | ICD-10-CM

## 2018-11-16 LAB
ALBUMIN SERPL BCP-MCNC: 3.8 G/DL
ALP SERPL-CCNC: 37 U/L
ALT SERPL W/O P-5'-P-CCNC: 12 U/L
ANION GAP SERPL CALC-SCNC: 5 MMOL/L
AST SERPL-CCNC: 14 U/L
B-HCG UR QL: NEGATIVE
B-HCG UR QL: NEGATIVE
BASOPHILS # BLD AUTO: 0.01 K/UL
BASOPHILS NFR BLD: 0.2 %
BILIRUB SERPL-MCNC: 0.4 MG/DL
BILIRUB UR QL STRIP: NEGATIVE
BUN SERPL-MCNC: 10 MG/DL
CALCIUM SERPL-MCNC: 9.5 MG/DL
CHLORIDE SERPL-SCNC: 105 MMOL/L
CLARITY UR: CLEAR
CO2 SERPL-SCNC: 26 MMOL/L
COLOR UR: YELLOW
CREAT SERPL-MCNC: 0.7 MG/DL
CTP QC/QA: YES
DIFFERENTIAL METHOD: NORMAL
EOSINOPHIL # BLD AUTO: 0.1 K/UL
EOSINOPHIL NFR BLD: 0.9 %
ERYTHROCYTE [DISTWIDTH] IN BLOOD BY AUTOMATED COUNT: 13.3 %
EST. GFR  (AFRICAN AMERICAN): >60 ML/MIN/1.73 M^2
EST. GFR  (NON AFRICAN AMERICAN): >60 ML/MIN/1.73 M^2
GLUCOSE SERPL-MCNC: 85 MG/DL
GLUCOSE UR QL STRIP: NEGATIVE
HCG INTACT+B SERPL-ACNC: 27 MIU/ML
HCT VFR BLD AUTO: 37.6 %
HGB BLD-MCNC: 12.4 G/DL
HGB UR QL STRIP: ABNORMAL
KETONES UR QL STRIP: NEGATIVE
LEUKOCYTE ESTERASE UR QL STRIP: NEGATIVE
LYMPHOCYTES # BLD AUTO: 2.2 K/UL
LYMPHOCYTES NFR BLD: 37.5 %
MCH RBC QN AUTO: 29.5 PG
MCHC RBC AUTO-ENTMCNC: 33 G/DL
MCV RBC AUTO: 90 FL
MONOCYTES # BLD AUTO: 0.7 K/UL
MONOCYTES NFR BLD: 12 %
NEUTROPHILS # BLD AUTO: 2.8 K/UL
NEUTROPHILS NFR BLD: 49.2 %
NITRITE UR QL STRIP: NEGATIVE
PH UR STRIP: 7 [PH] (ref 5–8)
PLATELET # BLD AUTO: 278 K/UL
PMV BLD AUTO: 10.1 FL
POTASSIUM SERPL-SCNC: 3.7 MMOL/L
PROT SERPL-MCNC: 7.7 G/DL
PROT UR QL STRIP: NEGATIVE
RBC # BLD AUTO: 4.2 M/UL
SODIUM SERPL-SCNC: 136 MMOL/L
SP GR UR STRIP: <=1.005 (ref 1–1.03)
URN SPEC COLLECT METH UR: ABNORMAL
UROBILINOGEN UR STRIP-ACNC: NEGATIVE EU/DL
WBC # BLD AUTO: 5.73 K/UL

## 2018-11-16 PROCEDURE — 81025 URINE PREGNANCY TEST: CPT

## 2018-11-16 PROCEDURE — 84702 CHORIONIC GONADOTROPIN TEST: CPT

## 2018-11-16 PROCEDURE — 81025 URINE PREGNANCY TEST: CPT | Performed by: NURSE PRACTITIONER

## 2018-11-16 PROCEDURE — 81003 URINALYSIS AUTO W/O SCOPE: CPT

## 2018-11-16 PROCEDURE — 80053 COMPREHEN METABOLIC PANEL: CPT

## 2018-11-16 PROCEDURE — 99283 EMERGENCY DEPT VISIT LOW MDM: CPT

## 2018-11-16 PROCEDURE — 85025 COMPLETE CBC W/AUTO DIFF WBC: CPT

## 2018-11-16 PROCEDURE — 25000003 PHARM REV CODE 250: Performed by: EMERGENCY MEDICINE

## 2018-11-16 RX ORDER — ACETAMINOPHEN 325 MG/1
650 TABLET ORAL
Status: COMPLETED | OUTPATIENT
Start: 2018-11-16 | End: 2018-11-16

## 2018-11-16 RX ADMIN — ACETAMINOPHEN 650 MG: 325 TABLET ORAL at 07:11

## 2018-11-16 NOTE — ED PROVIDER NOTES
Sort note: Iris Jarvis nontoxic/afebrile 28 y.o.  presented to the ED with c/o fatigue and lower abdominal cramping, concern for pregnancy.     Patient seen and medically screened by Nurse practitioner in Sort process due to ED crowding.  Appropriate tests and/or medications ordered.  Care transferred to an alternate provider when patient was placed in an Exam Room from the [facility] for physical exam, additional orders, and disposition. 4:54 PM. ANN Latham  11/16/18 [id number]

## 2018-11-16 NOTE — ED PROVIDER NOTES
Encounter Date: 2018       History     Chief Complaint   Patient presents with    Fatigue     Pt states for the past 3 days she has been feeling weak. Pt also reports bad cramps in her abdomen.      29yo female presents to the ED for fatigue, lower abdominal cramping, and concern for pregnancy.  Pt is , LMP two weeks ago and reported as normal.  Pt states that she is on Methotrexate for RA, and is concerned that she could be pregnant as she has been off of birth control for a few weeks.  Pt denies fever, n/v/d, dysuria, vaginal bleeding, and discharge.  She is tolerating PO fluids without difficulty.  This is the extent of the patient's complaints at this time.  Pain is cramping, constant, mild.  She denies concern for STI.      The history is provided by the patient.     Review of patient's allergies indicates:  No Known Allergies  Past Medical History:   Diagnosis Date    Keloid     RA (rheumatoid arthritis)      Past Surgical History:   Procedure Laterality Date    none      REPAIR-HAMMER TOE Bilateral 2017    Performed by Juan Leigh DPM at [facility]     Family History   Problem Relation Age of Onset    Hypertension Mother     Hypertension Maternal Grandmother     Diabetes Father     Diabetes Paternal Grandmother     Diabetes Paternal Aunt     Ovarian cancer Neg Hx     Colon cancer Neg Hx     Breast cancer Neg Hx      Social History     Tobacco Use    Smoking status: Never Smoker    Smokeless tobacco: Never Used   Substance Use Topics    Alcohol use: Yes     Alcohol/week: 0.6 oz     Types: 1 Standard drinks or equivalent per week     Comment: ocasionally     Drug use: No     Review of Systems   Constitutional: Positive for fatigue. Negative for appetite change, chills and fever.   HENT: Negative for congestion and sore throat.    Respiratory: Negative for cough.    Cardiovascular: Negative for chest pain.   Gastrointestinal: Positive for abdominal pain. Negative for diarrhea,  nausea and vomiting.   Genitourinary: Negative for dysuria, frequency, vaginal bleeding and vaginal discharge.   Skin: Negative for rash.   Neurological: Negative for weakness and headaches.   Psychiatric/Behavioral: Negative for confusion.   All other systems reviewed and are negative.      Physical Exam     Initial Vitals [11/16/18 1646]   BP Pulse Resp Temp SpO2   (!) 142/72 91 20 98.6 °F (37 °C) 98 %      MAP       --         Physical Exam    Nursing note and vitals reviewed.  Constitutional: Vital signs are normal. She appears well-developed and well-nourished. She is active and cooperative. She is easily aroused.  Non-toxic appearance. She does not have a sickly appearance. She does not appear ill. No distress.   HENT:   Head: Normocephalic and atraumatic.   Nose: Nose normal.   Mouth/Throat: Uvula is midline and oropharynx is clear and moist.   Eyes: Conjunctivae and EOM are normal.   Neck: Normal range of motion. Neck supple.   Cardiovascular: Normal rate, regular rhythm and normal heart sounds.   Pulmonary/Chest: Effort normal and breath sounds normal.   Abdominal: Soft. Normal appearance and bowel sounds are normal. She exhibits no distension. There is no tenderness. There is no rigidity, no rebound and no guarding.   Neurological: She is alert, oriented to person, place, and time and easily aroused. She has normal strength. GCS eye subscore is 4. GCS verbal subscore is 5. GCS motor subscore is 6.   Skin: Skin is warm, dry and intact. No rash noted.   Psychiatric: Her speech is normal and behavior is normal. Judgment and thought content normal. Her mood appears anxious. Cognition and memory are normal.         ED Course   Procedures  Labs Reviewed   URINALYSIS - Abnormal; Notable for the following components:       Result Value    Specific Gravity, UA <=1.005 (*)     Occult Blood UA Trace (*)     All other components within normal limits   CBC W/ AUTO DIFFERENTIAL   COMPREHENSIVE METABOLIC PANEL   POCT  URINE PREGNANCY          Imaging Results    None          Medical Decision Making:   Initial Assessment:   29yo female here for fatigue and concern for pregnancy.  She reports lower abdominal cramping.  No trauma, fever/chills, n/v/d, vaginal bleeding or discharge, or urinary symptoms.  Pt appears well, nontoxic.  Vitals stable.  Abd soft, non-tender.  No r/r/g, distention, or CVA tenderness.  Pt is anxious.   Differential Diagnosis:   Pregnancy, UTI, anxiety, STI  Clinical Tests:   Lab Tests: Ordered and Reviewed  ED Management:  Labs  UPT negative.   Pt is generally well-appearing and has concern for pregnancy as she is on methotrexate and has been off of birth control.  LMP was two weeks ago, reported as normal with history of regular periods.  Pt is tearful, reporting increased pain.  Pt will be placed in ED annex and turned over to another provider once room available for further examination and labs.                         Clinical Impression:   There were no encounter diagnoses.                             Josselin Farris, ANN  11/16/18 [id number]

## 2018-11-17 NOTE — PROVIDER PROGRESS NOTES - EMERGENCY DEPT.
Encounter Date: 2018    ED Physician Progress Notes             This is an assumption of care note.     Upon shift change, the patient was transferred to me from the NP @ 6:25 PM in stable condition.     Patient presented to ED with chief complaint of lower abdominal pain, fatigue.  Labs and UA reassuring.  Patient reports history of ovarian cyst. Had US 1 month ago but reports it was normal without any cysts.  Will add on TV US and reassess.    Update:   No acute events during shift.    B-HCG 27.   UPT negative in ED x2, possibly early pregnancy vs missed  with decreasing B-HCG.    TV US with L adnexal mass, no blood flow, concerning for possible ovarian torsion.    9:38 PM  Called Dr. Sharma, OB, left message for call back.    10:00 PM  Discussed with Dr. Sharma, who feels size is not consistent with torsion. He offered patient observation placement vs DC with OP f/u with her OB-GYN.    Discussed with patient, who states her OB is out of town next week, and she does not feel comfortable with DC at this time without definitive follow-up.    10:29 PM  Called Dr. Sharma again, awaiting return of call.    Dr. Sharma states patient will be able to follow-up in clinic on Monday of next week.  Patient feels comfortable with plan at this time. She is stable for D/C; given strict return precautions including worsening symptoms or any other concerns.        IMAGING:  Imaging Results    None           LABS:  Labs Reviewed   URINALYSIS - Abnormal; Notable for the following components:       Result Value    Specific Gravity, UA <=1.005 (*)     Occult Blood UA Trace (*)     All other components within normal limits   COMPREHENSIVE METABOLIC PANEL - Abnormal; Notable for the following components:    Alkaline Phosphatase 37 (*)     Anion Gap 5 (*)     All other components within normal limits   CBC W/ AUTO DIFFERENTIAL   HCG, QUANTITATIVE, PREGNANCY   POCT URINE PREGNANCY         MEDICATIONS:  Medications    acetaminophen tablet 650 mg (not administered)         IMPRESSION:  1. Cyst of right ovary    2. Left sided abdominal pain           DISCHARGE MEDICATIONS:  Discharge Medication List as of 11/16/2018 10:46 PM              DISPOSITION:  D/c in stable condition.

## 2018-11-17 NOTE — ED NOTES
Pt hungry and asking for something to eat. Dr. Short stated pt can have something to eat. Pt given a dinner tray.

## 2018-11-17 NOTE — ED NOTES
Report received from JJ Hilliard. Assumed care of pt at this time. Pt aaox3. rr even and unlabored on ra. Pt with c/o fatigue and body aches. Pt also with c/o pelvic pain that is 6/10. Pt denies vaginal bleeding or discharge. pt denies cp, sob, headache, bladder or bowel issues at this time.     APPEARANCE: Alert, oriented and in no acute distress.  CARDIAC: Normal rate and rhythm   PERIPHERAL VASCULAR: peripheral pulses present. Normal cap refill. No edema. Warm to touch.    RESPIRATORY:Normal rate and effort, breath sounds clear bilaterally throughout chest. Respirations are equal and unlabored no obvious signs of distress.  GASTRO: soft, bowel sounds normal, no tenderness, no abdominal distention.  MUSC: Full ROM. No bony tenderness or soft tissue tenderness. No obvious deformity. +generalized body aches  SKIN: Skin is warm and dry, normal skin turgor, mucous membranes moist.  NEURO: 5/5 strength major flexors/extensors bilaterally. Sensory intact to light touch bilaterally. Yodit coma scale: eyes open spontaneously-4, oriented & converses-5, obeys commands-6. No neurological abnormalities.   MENTAL STATUS: awake, alert and aware of environment.  EYE: PERRL, both eyes: pupils brisk and reactive to light. Normal size.  ENT: EARS: no obvious drainage. NOSE: no active bleeding.

## 2018-11-17 NOTE — ED NOTES
APPEARANCE: Alert, oriented and in no acute distress.  CARDIAC: Normal rate and rhythm, no murmur heard.   PERIPHERAL VASCULAR: peripheral pulses present. Normal cap refill. No edema. Warm to touch.    RESPIRATORY:Normal rate and effort, breath sounds clear bilaterally throughout chest. Respirations are equal and unlabored no obvious signs of distress.  GASTRO: soft, bowel sounds normal, no tenderness, no abdominal distention.  MUSC: Full ROM. No bony tenderness or soft tissue tenderness. No obvious deformity.  SKIN: Skin is warm and dry, normal skin turgor, mucous membranes moist.  NEURO: 5/5 strength major flexors/extensors bilaterally. Sensory intact to light touch bilaterally. [place] coma scale: eyes open spontaneously-4, oriented & converses-5, obeys commands-6. No neurological abnormalities.   MENTAL STATUS: awake, alert and aware of environment.  EYE: PERRL, both eyes: pupils brisk and reactive to light. Normal size.  ENT: EARS: no obvious drainage. NOSE: no active bleeding.

## 2018-11-17 NOTE — ED NOTES
Patient presents to ER with c/o weakness, headaches, abdominal pain, nausea and dizziness x 7 days, no medication taken, 7/10 on pain scale

## 2020-04-21 DIAGNOSIS — Z01.84 ANTIBODY RESPONSE EXAMINATION: ICD-10-CM

## 2020-05-21 DIAGNOSIS — Z01.84 ANTIBODY RESPONSE EXAMINATION: ICD-10-CM

## 2020-06-20 DIAGNOSIS — Z01.84 ANTIBODY RESPONSE EXAMINATION: ICD-10-CM

## 2020-07-20 DIAGNOSIS — Z01.84 ANTIBODY RESPONSE EXAMINATION: ICD-10-CM

## 2020-08-19 DIAGNOSIS — Z01.84 ANTIBODY RESPONSE EXAMINATION: ICD-10-CM

## 2020-09-18 DIAGNOSIS — Z01.84 ANTIBODY RESPONSE EXAMINATION: ICD-10-CM

## 2020-10-18 DIAGNOSIS — Z01.84 ANTIBODY RESPONSE EXAMINATION: ICD-10-CM

## 2020-11-17 DIAGNOSIS — Z01.84 ANTIBODY RESPONSE EXAMINATION: ICD-10-CM

## 2021-04-16 ENCOUNTER — PATIENT MESSAGE (OUTPATIENT)
Dept: RESEARCH | Facility: HOSPITAL | Age: 31
End: 2021-04-16

## 2021-08-19 ENCOUNTER — OFFICE VISIT (OUTPATIENT)
Dept: URGENT CARE | Facility: CLINIC | Age: 31
End: 2021-08-19
Payer: MEDICAID

## 2021-08-19 VITALS
SYSTOLIC BLOOD PRESSURE: 102 MMHG | BODY MASS INDEX: 30.12 KG/M2 | HEIGHT: 63 IN | OXYGEN SATURATION: 98 % | RESPIRATION RATE: 18 BRPM | TEMPERATURE: 99 F | DIASTOLIC BLOOD PRESSURE: 67 MMHG | WEIGHT: 170 LBS | HEART RATE: 87 BPM

## 2021-08-19 DIAGNOSIS — M06.9 RHEUMATOID ARTHRITIS, INVOLVING UNSPECIFIED SITE, UNSPECIFIED WHETHER RHEUMATOID FACTOR PRESENT: Primary | ICD-10-CM

## 2021-08-19 PROCEDURE — 99213 OFFICE O/P EST LOW 20 MIN: CPT | Mod: S$GLB,,, | Performed by: FAMILY MEDICINE

## 2021-08-19 PROCEDURE — 99213 PR OFFICE/OUTPT VISIT, EST, LEVL III, 20-29 MIN: ICD-10-PCS | Mod: S$GLB,,, | Performed by: FAMILY MEDICINE

## 2021-08-19 RX ORDER — ADALIMUMAB 40MG/0.8ML
KIT SUBCUTANEOUS
Qty: 2 SYRINGE | Refills: 0 | Status: SHIPPED | OUTPATIENT
Start: 2021-08-19 | End: 2021-09-16 | Stop reason: SDUPTHER

## 2021-08-19 RX ORDER — ADALIMUMAB 40MG/0.8ML
KIT SUBCUTANEOUS
COMMUNITY
End: 2021-08-19 | Stop reason: SDUPTHER

## 2021-08-20 ENCOUNTER — SPECIALTY PHARMACY (OUTPATIENT)
Dept: PHARMACY | Facility: CLINIC | Age: 31
End: 2021-08-20

## 2021-09-16 ENCOUNTER — SPECIALTY PHARMACY (OUTPATIENT)
Dept: PHARMACY | Facility: CLINIC | Age: 31
End: 2021-09-16

## 2021-09-16 RX ORDER — ADALIMUMAB 40MG/0.8ML
KIT SUBCUTANEOUS
Qty: 2 SYRINGE | Refills: 0 | Status: SHIPPED | OUTPATIENT
Start: 2021-09-16 | End: 2021-11-18 | Stop reason: SDUPTHER

## 2021-09-28 ENCOUNTER — SPECIALTY PHARMACY (OUTPATIENT)
Dept: PHARMACY | Facility: CLINIC | Age: 31
End: 2021-09-28

## 2021-10-22 ENCOUNTER — SPECIALTY PHARMACY (OUTPATIENT)
Dept: PHARMACY | Facility: CLINIC | Age: 31
End: 2021-10-22
Payer: MEDICAID

## 2021-10-22 RX ORDER — ADALIMUMAB 40MG/0.8ML
KIT SUBCUTANEOUS
Qty: 2 EACH | Refills: 6 | Status: CANCELLED | OUTPATIENT
Start: 2021-10-22

## 2021-10-31 RX ORDER — ADALIMUMAB 40MG/0.8ML
KIT SUBCUTANEOUS
Qty: 2 EACH | Refills: 6 | OUTPATIENT
Start: 2021-10-31

## 2021-11-18 ENCOUNTER — OFFICE VISIT (OUTPATIENT)
Dept: URGENT CARE | Facility: CLINIC | Age: 31
End: 2021-11-18
Payer: MEDICAID

## 2021-11-18 VITALS
DIASTOLIC BLOOD PRESSURE: 80 MMHG | RESPIRATION RATE: 16 BRPM | WEIGHT: 170 LBS | TEMPERATURE: 98 F | HEIGHT: 63 IN | HEART RATE: 68 BPM | OXYGEN SATURATION: 98 % | SYSTOLIC BLOOD PRESSURE: 128 MMHG | BODY MASS INDEX: 30.12 KG/M2

## 2021-11-18 DIAGNOSIS — M06.9 RHEUMATOID ARTHRITIS, INVOLVING UNSPECIFIED SITE, UNSPECIFIED WHETHER RHEUMATOID FACTOR PRESENT: Primary | ICD-10-CM

## 2021-11-18 PROCEDURE — 99213 OFFICE O/P EST LOW 20 MIN: CPT | Mod: S$GLB,,, | Performed by: FAMILY MEDICINE

## 2021-11-18 PROCEDURE — 99213 PR OFFICE/OUTPT VISIT, EST, LEVL III, 20-29 MIN: ICD-10-PCS | Mod: S$GLB,,, | Performed by: FAMILY MEDICINE

## 2021-11-18 RX ORDER — ADALIMUMAB 40MG/0.8ML
KIT SUBCUTANEOUS
Qty: 2 EACH | Refills: 2 | Status: SHIPPED | OUTPATIENT
Start: 2021-11-18 | End: 2021-11-18 | Stop reason: SDUPTHER

## 2021-11-18 RX ORDER — ADALIMUMAB 40MG/0.8ML
KIT SUBCUTANEOUS
Qty: 2 EACH | Refills: 2 | Status: SHIPPED | OUTPATIENT
Start: 2021-11-18 | End: 2022-02-15 | Stop reason: SDUPTHER

## 2021-12-16 ENCOUNTER — SPECIALTY PHARMACY (OUTPATIENT)
Dept: PHARMACY | Facility: CLINIC | Age: 31
End: 2021-12-16
Payer: MEDICAID

## 2022-01-19 ENCOUNTER — SPECIALTY PHARMACY (OUTPATIENT)
Dept: PHARMACY | Facility: CLINIC | Age: 32
End: 2022-01-19
Payer: MEDICAID

## 2022-01-20 NOTE — TELEPHONE ENCOUNTER
Specialty Pharmacy - Refill Coordination    Specialty Medication Orders Linked to Encounter    Flowsheet Row Most Recent Value   Medication #1 adalimumab (HUMIRA) 40 mg/0.8 mL SyKt injection (Order#[id number], Rx#[id number])          Refill Questions - Documented Responses    Flowsheet Row Most Recent Value   Patient Availability and HIPAA Verification    Does patient want to proceed with activity? Yes   HIPAA/medical authority confirmed? Yes   Relationship to patient of person spoken to? Self   Refill Screening Questions    Changes to allergies? No   Changes to medications? No   New conditions since last clinic visit? No   Unplanned office visit, urgent care, ED, or hospital admission in the last 4 weeks? No   How does patient/caregiver feel medication is working? Good   Financial problems or insurance changes? No   How many doses of your specialty medications were missed in the last 4 weeks? 0   Would patient like to speak to a pharmacist? No   When does the patient need to receive the medication? 01/24/22   Refill Delivery Questions    How will the patient receive the medication? Delivery Isela   When does the patient need to receive the medication? 01/24/22   Shipping Address Home   Address in [facility] confirmed and updated if neccessary? Yes   Expected Copay ($) 0   Is the patient able to afford the medication copay? Yes   Payment Method zero copay   Days supply of Refill 28   Supplies needed? No supplies needed   Refill activity completed? Yes   Refill activity plan Refill scheduled   Shipment/Pickup Date: 01/21/22          Current Outpatient Medications   Medication Sig    adalimumab (HUMIRA) 40 mg/0.8 mL SyKt injection Inject 0.8 mL (40 mg) into the skin every 14 days    ethynodiol-ethinyl estradiol (KELNOR) 1-35 mg-mcg per tablet Take 1 tablet by mouth once daily.    folic acid (FOLVITE) 1 MG tablet Take 1 mg by mouth once daily.   Last reviewed on 11/18/2021  2:21 PM by Kristine Bhatia MA    Review  of patient's allergies indicates:  No Known Allergies Last reviewed on  11/18/2021 2:21 PM by Kristine Bhatia      Tasks added this encounter   1/19/2022 - Referral Authorization  2/14/2022 - Refill Call (Auto Added)   Tasks due within next 3 months   No tasks due.     Yodit Cagle, PharmD  [facility]  [address] [place], [place] [address]lane  [facility] [address]  Phone: [phone]  Fax: [phone]

## 2022-01-31 ENCOUNTER — SPECIALTY PHARMACY (OUTPATIENT)
Dept: PHARMACY | Facility: CLINIC | Age: 32
End: 2022-01-31
Payer: MEDICAID

## 2022-01-31 RX ORDER — ADALIMUMAB 40MG/0.8ML
KIT SUBCUTANEOUS
Qty: 2 EACH | Refills: 2 | Status: CANCELLED | OUTPATIENT
Start: 2022-01-31

## 2022-01-31 NOTE — TELEPHONE ENCOUNTER
Humira re-auth submitted via [facility] - (Key: [id number])   [id number]. Humira approved until 1/31/23

## 2022-02-15 RX ORDER — ADALIMUMAB 40MG/0.8ML
KIT SUBCUTANEOUS
Qty: 2 EACH | Refills: 2 | Status: SHIPPED | OUTPATIENT
Start: 2022-02-15 | End: 2022-04-12 | Stop reason: SDUPTHER

## 2022-02-17 ENCOUNTER — SPECIALTY PHARMACY (OUTPATIENT)
Dept: PHARMACY | Facility: CLINIC | Age: 32
End: 2022-02-17
Payer: MEDICAID

## 2022-02-17 NOTE — TELEPHONE ENCOUNTER
Humira refills received. PA on file. [employer] for 2022. Releasing to [facility].

## 2022-02-23 ENCOUNTER — SPECIALTY PHARMACY (OUTPATIENT)
Dept: PHARMACY | Facility: CLINIC | Age: 32
End: 2022-02-23
Payer: MEDICAID

## 2022-03-10 NOTE — TELEPHONE ENCOUNTER
Specialty Pharmacy - Refill Coordination    Specialty Medication Orders Linked to Encounter    Flowsheet Row Most Recent Value   Medication #1 adalimumab (HUMIRA) 40 mg/0.8 mL SyKt injection (Order#[id number], Rx#[id number])        Refill Questions - Documented Responses    Flowsheet Row Most Recent Value   Patient Availability and HIPAA Verification    Does patient want to proceed with activity? Unable to Reach        We have had multiple attempts to the patient and have been unsuccessful to reach the patient. We will stop reaching out to the patient but in the event that the patient needs the med and contacts us, we will communicate and begin dispensing for the patient. At your next visit with the patient, please review the importance of being in contact with our specialty pharmacy as a part of our care team.    Interventions added this encounter   Closed: [facility] Provider Intervention - Drug therapy adherence: adalimumab (HUMIRA) 40 mg/0.8 mL SyKt injection     Connie Nam, PharmD  [facility]  [address] [place] [id number]  Phone: [phone]  Fax: [phone]

## 2022-04-11 ENCOUNTER — SPECIALTY PHARMACY (OUTPATIENT)
Dept: PHARMACY | Facility: CLINIC | Age: 32
End: 2022-04-11
Payer: MEDICAID

## 2022-04-12 NOTE — TELEPHONE ENCOUNTER
Incoming call from pt about status of Humira. Due to inject on 4/18      Notified pt that PA was required for new insurance and submitted yesterday, but denied.     Notified her that usually the next step would be an appeal. Will route assigned h for urgent f/u

## 2022-04-12 NOTE — TELEPHONE ENCOUNTER
humira PA denied because patient needs TB test on file. Called Dr Alcaraz's office [[phone]] to see if patient had one on file, and also to get updated chart notes. Patient does not have TB on file. Nurse, Will, will talk to MD and get patient scheduled for TB. Will also fax over recent chart notes from patient's appt on 4/8/22   Patient informed and aware that she will need tb for PA approval. Will reach out to office and schedule.

## 2022-04-18 ENCOUNTER — PATIENT MESSAGE (OUTPATIENT)
Dept: ADMINISTRATIVE | Facility: OTHER | Age: 32
End: 2022-04-18
Payer: MEDICAID

## 2022-04-18 NOTE — TELEPHONE ENCOUNTER
Call to office to follow up on labs. Nurse states that QF gold Tb test was NEGATIVE, and that she will fax chart notes and results to [facility]. Provided fax number. Will notify assigned pharmacist.     Benjamin Maurer, PharmD  Clinical Pharmacist  [facility]  P: [phone]

## 2022-04-18 NOTE — TELEPHONE ENCOUNTER
Patient called to check the status of TB test for insurance PA. Test was completed in outside facility, ([facility]) so not able to see results. Patient requests a message sent to provider's office to follow up on lab results so that PA can be completed. Will call office to follow up on labs.    Benjamin Maurer, PharmD  Clinical Pharmacist  [facility]  P: [phone]

## 2022-04-19 NOTE — TELEPHONE ENCOUNTER
Faxed negative TB results to [phone] for reconsideration. Plan MD will review results within 24 hours and schedule a peer to peer for final determination. Will follow up on 4/20 for decision. Patient aware.

## 2022-04-20 ENCOUNTER — TELEPHONE (OUTPATIENT)
Dept: PHARMACY | Facility: CLINIC | Age: 32
End: 2022-04-20
Payer: MEDICAID

## 2022-04-20 NOTE — TELEPHONE ENCOUNTER
Incoming call from pt wanting to know the status of appeal. Informed pt that assigned rph will be doing a status check today and will let her know the status.

## 2022-04-20 NOTE — TELEPHONE ENCOUNTER
Incoming call from Amanda from [facility] wanting to know if [facility] submitted a PA. She received a msg from [employer] stating a PA was denied on 4/8. [facility] did not submit a PA nor do we have an order.

## 2022-04-21 NOTE — TELEPHONE ENCOUNTER
Incoming call from pt to check the status of Humira. It looks like the initial PA was denied. Connie confirmed the peer to peer has been scheduled. Connie agreed to follow up on tomorrow, if needed.

## 2022-04-22 NOTE — TELEPHONE ENCOUNTER
Called to check status of Humira appeal [[phone]] - [facility]. States that request is still pending, and suggested that I submit another PA since there is new clinical information.   Resumbitted via [facility]MM  (Key: [id number]) and marked URGENT.

## 2022-04-25 NOTE — TELEPHONE ENCOUNTER
Peer to peer completed and denial overturned. PA approved from 4/25/2022-4/25/2023. Awaiting fax with approval details.   Patient locked into [facility]. Transferred prescription. Patient informed and provided with [facility]'s contact info. No further questions or concerns at this time. Closing out at [facility].

## 2022-04-25 NOTE — TELEPHONE ENCOUNTER
Incoming call from the patient checking the status of her humira. Informed the patient that Connie is completing a peer to peer, but the insurance has not contacted her yet. Patient requested that Connie call after the peer to peer to update her with more information.

## 2025-01-25 NOTE — TRANSFER OF CARE
"Anesthesia Transfer of Care Note    Patient: Iris Jarvis    Procedure(s) Performed: Procedure(s) (LRB):  REPAIR-HAMMER TOE (Bilateral)    Patient location: [facility]    Anesthesia Type: MAC    Transport from OR: Transported from OR on room air with adequate spontaneous ventilation    Post pain: adequate analgesia    Post assessment: no apparent anesthetic complications and tolerated procedure well    Post vital signs: stable    Level of consciousness: awake, alert and oriented    Nausea/Vomiting: no nausea/vomiting    Complications: none    Transfer of care protocol was followed      Last vitals:   Visit Vitals  /62 (BP Location: Right arm, Patient Position: Lying, BP Method: Automatic)   Pulse 78   Temp 37 °C (98.6 °F) (Oral)   Resp 16   Ht 5' 3" (1.6 m)   Wt 70.8 kg (156 lb)   LMP 06/19/2017 (Approximate)   Breastfeeding? No   BMI 27.63 kg/m²     " 100% of the time

## (undated) DEVICE — SUT ETHILON 4-0 PS2 18 BLK

## (undated) DEVICE — SUT 3/0 18IN COATED VICRYLP

## (undated) DEVICE — SEE MEDLINE ITEM 152523

## (undated) DEVICE — NDL HYPO REG 25G X 1 1/2

## (undated) DEVICE — STOCKINET TUBULAR 1 PLY 6X60IN

## (undated) DEVICE — SEE MEDLINE ITEM 156953

## (undated) DEVICE — PAD PREP 50/CA

## (undated) DEVICE — SYR 10CC LUER LOCK

## (undated) DEVICE — SEE MEDLINE ITEM 152564

## (undated) DEVICE — GAUZE SPONGE 4'X4 12 PLY

## (undated) DEVICE — SUT 3-0 VICRYL / FS-2

## (undated) DEVICE — NDL 18GA X1 1/2 REG BEVEL

## (undated) DEVICE — SEE L#120831

## (undated) DEVICE — PADDING CAST 4IN SPECIALIST

## (undated) DEVICE — APPLICATOR CHLORAPREP ORN 26ML

## (undated) DEVICE — SUT 4/0 18IN COATED VICRYL

## (undated) DEVICE — GAUZE SPONGE 4X4 12PLY

## (undated) DEVICE — SYR 3CC LUER LOC

## (undated) DEVICE — TOURNIQUET SB QC DP 18X4IN

## (undated) DEVICE — BLADE SAGITTA 5/BX

## (undated) DEVICE — SEE MEDLINE ITEM 146270

## (undated) DEVICE — SEE MEDLINE ITEM 152515

## (undated) DEVICE — PAD CAST SPECIALIST STRL 4

## (undated) DEVICE — COVER OVERHEAD SURG LT BLUE

## (undated) DEVICE — BLADE SAGITTAL FINE 5.5 X 18.5

## (undated) DEVICE — BANDAGE DERMACEA STRETCH 4X1IN

## (undated) DEVICE — SHOE POST-OP VEL CLOS FM W/LG

## (undated) DEVICE — GLOVE BIOGEL 7.0